# Patient Record
Sex: MALE | Race: WHITE | NOT HISPANIC OR LATINO | Employment: STUDENT | URBAN - METROPOLITAN AREA
[De-identification: names, ages, dates, MRNs, and addresses within clinical notes are randomized per-mention and may not be internally consistent; named-entity substitution may affect disease eponyms.]

---
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
## 2022-08-26 ENCOUNTER — OFFICE VISIT (OUTPATIENT)
Dept: FAMILY MEDICINE CLINIC | Facility: CLINIC | Age: 22
End: 2022-08-26
Payer: COMMERCIAL

## 2022-08-26 VITALS
SYSTOLIC BLOOD PRESSURE: 128 MMHG | HEART RATE: 82 BPM | DIASTOLIC BLOOD PRESSURE: 80 MMHG | WEIGHT: 175 LBS | TEMPERATURE: 98.8 F | OXYGEN SATURATION: 98 % | BODY MASS INDEX: 26.52 KG/M2 | RESPIRATION RATE: 12 BRPM | HEIGHT: 68 IN

## 2022-08-26 DIAGNOSIS — R20.2 NUMBNESS AND TINGLING IN RIGHT HAND: ICD-10-CM

## 2022-08-26 DIAGNOSIS — J30.9 ALLERGIC RHINITIS, UNSPECIFIED SEASONALITY, UNSPECIFIED TRIGGER: ICD-10-CM

## 2022-08-26 DIAGNOSIS — Z76.89 ENCOUNTER TO ESTABLISH CARE: Primary | ICD-10-CM

## 2022-08-26 DIAGNOSIS — R20.0 NUMBNESS AND TINGLING IN RIGHT HAND: ICD-10-CM

## 2022-08-26 PROCEDURE — 99204 OFFICE O/P NEW MOD 45 MIN: CPT

## 2022-08-26 NOTE — PATIENT INSTRUCTIONS
Carpal Tunnel Syndrome   AMBULATORY CARE:   Carpal tunnel syndrome (CTS)  is a condition that causes pressure to build in the carpal tunnel  The carpal tunnel is a small area between bones and tissues in your wrist  Swelling in this area puts pressure on the median nerve  The median nerve controls muscles and feeling in the hand  Common signs and symptoms:   Dull, sharp, or shooting pain in your hand    Numbness, tingling, or a burning feeling in your thumb, first finger, and middle finger    Arm pain that may extend to your shoulder    Weakness in your hand    Swelling in your hand    Not being able to control how your hand moves, or you drop objects    Seek care immediately if:   You suddenly lose feeling in your hand or fingers and you cannot move them  Your hand suddenly changes color  Contact your healthcare provider if:   Your symptoms get worse  Your hand and fingers are so weak that you cannot grab, squeeze, or lift items  You have questions or concerns about your condition or care  Treatment  may not be needed  If your symptoms continue or are severe, you may need any of the following:  NSAIDs  may be recommended to decrease swelling and pain  NSAIDs are available without a doctor's order  Ask your healthcare provider which medicine is right for you and how much to take  Take as directed  NSAIDs can cause stomach bleeding or kidney problems if not taken correctly  If you take blood thinning medicine, always ask your healthcare provider if NSAIDs are safe for you  Steroid injections  may help decrease pain and swelling  Steroid medicine is injected into the carpal tunnel  Transcutaneous electric nerve stimulation  uses mild electrical impulses to help decrease your wrist pain      Surgery  called decompression may be used to take pressure off of the median nerve in your wrist     Manage your symptoms:   Apply ice to your wrist   Ice helps decrease swelling and pain in your wrist  Ice may also help prevent tissue damage  Use an ice pack, or put crushed ice in a plastic bag  Cover the ice pack with a towel  Place it on your wrist for 15 to 20 minutes every hour, or as directed  Rest your hands  Let your hands rest for a short time between repetitive motions, such as typing  If you feel pain, stop what you are doing and gently massage your wrist and hand  Get physical and occupational therapy, if directed  Physical therapists will show you ways to exercise and strengthen your wrist  Occupational therapists will show you safe ways to use your wrist while you do your usual activities  Use a wrist splint as directed  A splint will keep your wrist straight or in a slightly bent position  A wrist splint decreases pressure on the median nerve by letting your wrist rest  You may need to wear the splint for up to 8 weeks  You may need to wear it at night  Follow up with your doctor as directed:  Write down your questions so you remember to ask them during your visits  © Copyright [employer] 2022 Information is for End User's use only and may not be sold, redistributed or otherwise used for commercial purposes  All illustrations and images included in CareNotes® are the copyrighted property of [employer]  or [employer]   The above information is an  only  It is not intended as medical advice for individual conditions or treatments  Talk to your doctor, nurse or pharmacist before following any medical regimen to see if it is safe and effective for you

## 2022-08-26 NOTE — PROGRESS NOTES
BMI Counseling: Body mass index is 26 61 kg/m²  The BMI is above normal  Nutrition recommendations include decreasing portion sizes, encouraging healthy choices of fruits and vegetables, decreasing fast food intake, consuming healthier snacks, limiting drinks that contain sugar, moderation in carbohydrate intake, increasing intake of lean protein, reducing intake of saturated and trans fat and reducing intake of cholesterol  Exercise recommendations include moderate physical activity 150 minutes/week and exercising 3-5 times per week  Rationale for BMI follow-up plan is due to patient being overweight or obese  Depression Screening and Follow-up Plan: Patient was screened for depression during today's encounter  They screened negative with a PHQ-2 score of 0  Assessment/Plan:         Problem List Items Addressed This Visit        Respiratory    Allergic rhinitis      controlled            Other    Numbness and tingling in right hand     Family history of carpal tunnel,  Scheduled with Orthopedics,  PT,  And make an appointment for ultrasound  Start using brace at night  Relevant Orders    US MSK limited    Ambulatory Referral to Orthopedic Surgery    Ambulatory Referral to PT/OT Hand Therapy      Other Visit Diagnoses     Encounter to establish care    -  Primary            Subjective:      Patient ID: Kamar Felix is a 23 y o  male  Patient presents to the office in order to establish care  He is complaining of bilateral, but mostly right hand numbness and tingling that is been going on for while  He does have family history of carpal tunnel  He works as a   He has tried Motrin as needed with little relief  He is denying any headaches, dizziness, shortness a breath or chest pain         The following portions of the patient's history were reviewed and updated as appropriate:   Past Medical History:  He has a past medical history of Allergic, Chronic serous otitis media of right ear (1/30/2015), and Perforation of tympanic membrane (2/26/2014)  ,  _______________________________________________________________________  Medical Problems:  does not have any pertinent problems on file ,  _______________________________________________________________________  Past Surgical History:   has a past surgical history that includes No past surgeries  ,  _______________________________________________________________________  Family History:  family history includes No Known Problems in his father, mother, paternal grandfather, paternal grandmother, sister, and sister  ,  _______________________________________________________________________  Social History:   reports that he has never smoked  He has never used smokeless tobacco  He reports that he does not drink alcohol and does not use drugs  ,  _______________________________________________________________________  Allergies:  has No Known Allergies     _______________________________________________________________________  No current outpatient medications on file  No current facility-administered medications for this visit      _______________________________________________________________________  Review of Systems   Constitutional: Negative for chills and fever  HENT: Negative for ear pain and sore throat  Eyes: Negative for pain and visual disturbance  Respiratory: Negative for cough and shortness of breath  Cardiovascular: Negative for chest pain and palpitations  Gastrointestinal: Negative for abdominal pain and vomiting  Genitourinary: Negative for dysuria and hematuria  Musculoskeletal: Negative for arthralgias and back pain  Skin: Negative for color change and rash  Neurological: Positive for numbness  Negative for seizures and syncope  All other systems reviewed and are negative          Objective:  Vitals:    08/26/22 1455   BP: 128/80   Pulse: 82   Resp: 12   Temp: 98 8 °F (37 1 °C)   SpO2: 98%   Weight: 79 4 kg (175 lb)   Height: 5' 8" (1 727 m)     Body mass index is 26 61 kg/m²  Physical Exam  Vitals and nursing note reviewed  Constitutional:       General: He is not in acute distress  Appearance: Normal appearance  He is not ill-appearing  Cardiovascular:      Rate and Rhythm: Normal rate and regular rhythm  Pulses: Normal pulses  Heart sounds: Normal heart sounds  Pulmonary:      Effort: Pulmonary effort is normal  No respiratory distress  Breath sounds: Normal breath sounds  No wheezing  Musculoskeletal:         General: Normal range of motion  Skin:     General: Skin is warm and dry  Neurological:      Mental Status: He is alert and oriented to person, place, and time  Sensory: No sensory deficit  Motor: No weakness  Gait: Gait normal    Psychiatric:         Mood and Affect: Mood normal          Behavior: Behavior normal          Thought Content:  Thought content normal          Judgment: Judgment normal

## 2022-08-26 NOTE — ASSESSMENT & PLAN NOTE
Family history of carpal tunnel,  Scheduled with Orthopedics,  PT,  And make an appointment for ultrasound  Start using brace at night

## 2022-09-17 ENCOUNTER — OFFICE VISIT (OUTPATIENT)
Dept: OBGYN CLINIC | Facility: CLINIC | Age: 22
End: 2022-09-17
Payer: COMMERCIAL

## 2022-09-17 VITALS
WEIGHT: 174 LBS | DIASTOLIC BLOOD PRESSURE: 71 MMHG | SYSTOLIC BLOOD PRESSURE: 107 MMHG | BODY MASS INDEX: 26.37 KG/M2 | HEIGHT: 68 IN | HEART RATE: 67 BPM

## 2022-09-17 DIAGNOSIS — M77.8 TENDINITIS OF BOTH WRISTS: ICD-10-CM

## 2022-09-17 DIAGNOSIS — G56.03 CARPAL TUNNEL SYNDROME, BILATERAL: Primary | ICD-10-CM

## 2022-09-17 PROCEDURE — 99243 OFF/OP CNSLTJ NEW/EST LOW 30: CPT | Performed by: FAMILY MEDICINE

## 2022-09-17 RX ORDER — NAPROXEN 500 MG/1
500 TABLET ORAL 2 TIMES DAILY WITH MEALS
Qty: 60 TABLET | Refills: 0 | Status: SHIPPED | OUTPATIENT
Start: 2022-09-17

## 2022-09-17 NOTE — LETTER
September 17, 2022     Jason Soria, [address] [address]    Patient: Jamaal Shepard   YOB: 2000   Date of Visit: 9/17/2022       Dear Dr Светлана Blackman: Thank you for referring Jamaal Shepard to me for evaluation  Below are my notes for this consultation  If you have questions, please do not hesitate to call me  I look forward to following your patient along with you  Sincerely,        Florecita Sapp DO        CC: No Recipients  Florecita Sapp DO  9/17/2022 11:05 AM  Sign when Signing Visit  Assessment/Plan:  Assessment/Plan   Diagnoses and all orders for this visit:    Carpal tunnel syndrome, bilateral  -     Ambulatory Referral to Orthopedic Surgery  -     naproxen (NAPROSYN) 500 mg tablet; Take 1 tablet (500 mg total) by mouth 2 (two) times a day with meals  -     Ambulatory Referral to PT/OT Hand Therapy; Future    Tendinitis of both wrists  -     naproxen (NAPROSYN) 500 mg tablet; Take 1 tablet (500 mg total) by mouth 2 (two) times a day with meals  -     Ambulatory Referral to PT/OT Hand Therapy; Future        28-year-old right-hand-dominant male  with bilateral wrist pain and numbness and tingling both hands more than 4 months duration  Discussed with patient physical exam, impression and plan  Physical exam unremarkable for bony or soft tissue tenderness the wrist and hands  He demonstrates intact range of motion and strength in the wrist and digits of both hands  He has normal sensation both upper extremities  There is positive median nerve Tinel's on the right  Phalen's is unremarkable bilaterally  He has normal radial pulse bilaterally  Clinical impression is that he is symptomatic from carpal tunnel syndrome and tendinitis both wrist   I discussed regimen of splinting, anti-inflammatory, and formal therapy  Surgery is not warranted without failing conservative management  He is to continue with splinting at night    He is to take naproxen 500 mg twice daily with food for 2 weeks  He is to start taking tumeric at least 1000 mg daily and tart cherry at least 1000 mg daily  He is to apply topical diclofenac gel 3 times a day to the volar aspect both wrist for the next 10 days and may also apply topical CBD  He is to start hand therapy as soon as possible and do home exercises as directed  He will follow up if no improvement after 4 weeks of formal therapy  Subjective:   Patient ID: Rolan Mcburney is a 23 y o  male  Chief Complaint   Patient presents with    Right Hand - Pain, Numbness    Left Hand - Pain       62-year-old right-hand-dominant male  presents for evaluation of bilateral wrist pain and numbness and tingling both hands more than 4 months duration  He denies particular trauma or inciting event  Pain described as gradual in onset, generalized to the wrist and radiating distally to the hand, associated numbness and tingling in the 1st 3 digits of both hands,, right worse than left, worse with repetitive gripping activities and improved resting  He sometimes takes ibuprofen to help with symptoms  He saw primary care provider and was suspected of having carpal tunnel  He was recommended on splint at nighttime, taking NSAIDs, referred for ultrasound of the wrist, and referred to orthopedic care  He has been wearing splint at night mostly in the right wrist     Hand Pain  This is a new problem  The current episode started more than 1 month ago  The problem occurs daily  The problem has been gradually worsening  Associated symptoms include arthralgias, numbness and weakness  Pertinent negatives include no abdominal pain, chest pain, chills, fever, joint swelling, rash or sore throat  Exacerbated by: Gripping  He has tried rest, immobilization and NSAIDs for the symptoms  The treatment provided mild relief             The following portions of the patient's history were reviewed and updated as appropriate: He has a past medical history of Allergic, Chronic serous otitis media of right ear (1/30/2015), and Perforation of tympanic membrane (2/26/2014)  He  has a past surgical history that includes No past surgeries  His family history includes No Known Problems in his father, mother, paternal grandfather, paternal grandmother, sister, and sister  He  reports that he has never smoked  He has never used smokeless tobacco  He reports that he does not drink alcohol and does not use drugs  He has No Known Allergies       Review of Systems   Constitutional: Negative for chills and fever  HENT: Negative for sore throat  Eyes: Negative for visual disturbance  Respiratory: Negative for shortness of breath  Cardiovascular: Negative for chest pain  Gastrointestinal: Negative for abdominal pain  Genitourinary: Negative for flank pain  Musculoskeletal: Positive for arthralgias  Negative for joint swelling  Skin: Negative for rash and wound  Neurological: Positive for weakness and numbness  Hematological: Does not bruise/bleed easily  Psychiatric/Behavioral: Negative for self-injury  Objective:  Vitals:    09/17/22 0854   BP: 107/71   Pulse: 67   Weight: 78 9 kg (174 lb)   Height: 5' 8" (1 727 m)     Right Hand Exam     Muscle Strength   The patient has normal right wrist strength  Tests   Phalens Sign: negative  Tinel's sign (median nerve): positive  Finkelstein's test: negative    Other   Sensation: normal  Pulse: present      Left Hand Exam     Muscle Strength   The patient has normal left wrist strength  Tests   Phalens Sign: negative  Tinel's sign (median nerve): negative  Finkelstein's test: negative    Other   Sensation: normal  Pulse: present      Right Elbow Exam     Tenderness   The patient is experiencing no tenderness  Muscle Strength   Right elbow normal strength: 5/5 flexion and extension      Tests   Tinel's sign (cubital tunnel): negative      Left Elbow Exam     Tenderness The patient is experiencing no tenderness  Muscle Strength   Left elbow normal strength: 5/5 flexion and extension  Tests   Tinel's sign (cubital tunnel): negative          Observations     Left Wrist/Hand   Negative for deformity  Right Wrist/Hand   Negative for deformity  Tenderness     Left Wrist/Hand   No tenderness in the first dorsal compartment, second dorsal compartment, fifth dorsal compartment, sixth dorsal compartment, carpometacarpal joint, triangular fibrocartilage complex , distal radioulnar joint, scaphoid and lunate  Right Wrist/Hand   No tenderness in the first dorsal compartment, second dorsal compartment, fifth dorsal compartment, sixth dorsal compartment, carpometacarpal joint, triangular fibrocartilage complex , distal radioulnar joint, scaphoid and lunate  Active Range of Motion     Left Wrist   Normal active range of motion    Right Wrist   Normal active range of motion    Additional Active Range of Motion Details  Normal range of motion of fingers of both hands    Strength/Myotome Testing     Left Wrist/Hand   Normal wrist strength    Right Wrist/Hand   Normal wrist strength    Tests     Left Wrist/Hand   Negative AIN OK sign, distal radial-ulnar joint stress, Finkelstein's, Phalen's sign, TFCC load, Tinel's sign (medial nerve) and Tinel's sign (radial tunnel)  Right Wrist/Hand   Positive Tinel's sign (medial nerve)  Negative AIN OK sign, distal radial-ulnar joint stress, Finkelstein's, Phalen's sign, TFCC load and Tinel's sign (radial tunnel)  Physical Exam  Vitals and nursing note reviewed  Constitutional:       General: He is not in acute distress  Appearance: He is well-developed  He is not ill-appearing or diaphoretic  HENT:      Head: Normocephalic and atraumatic  Right Ear: External ear normal       Left Ear: External ear normal    Eyes:      Conjunctiva/sclera: Conjunctivae normal    Neck:      Trachea: No tracheal deviation  Cardiovascular:      Rate and Rhythm: Normal rate  Pulmonary:      Effort: Pulmonary effort is normal  No respiratory distress  Abdominal:      General: There is no distension  Musculoskeletal:         General: No swelling, tenderness, deformity or signs of injury  Right hand: No deformity  Left hand: No deformity  Skin:     General: Skin is warm and dry  Coloration: Skin is not jaundiced or pale  Neurological:      Mental Status: He is alert and oriented to person, place, and time  Psychiatric:         Mood and Affect: Mood normal          Behavior: Behavior normal          Thought Content:  Thought content normal          Judgment: Judgment normal

## 2022-09-17 NOTE — PROGRESS NOTES
Assessment/Plan:  Assessment/Plan   Diagnoses and all orders for this visit:    Carpal tunnel syndrome, bilateral  -     Ambulatory Referral to Orthopedic Surgery  -     naproxen (NAPROSYN) 500 mg tablet; Take 1 tablet (500 mg total) by mouth 2 (two) times a day with meals  -     Ambulatory Referral to PT/OT Hand Therapy; Future    Tendinitis of both wrists  -     naproxen (NAPROSYN) 500 mg tablet; Take 1 tablet (500 mg total) by mouth 2 (two) times a day with meals  -     Ambulatory Referral to PT/OT Hand Therapy; Future        47-year-old right-hand-dominant male  with bilateral wrist pain and numbness and tingling both hands more than 4 months duration  Discussed with patient physical exam, impression and plan  Physical exam unremarkable for bony or soft tissue tenderness the wrist and hands  He demonstrates intact range of motion and strength in the wrist and digits of both hands  He has normal sensation both upper extremities  There is positive median nerve Tinel's on the right  Phalen's is unremarkable bilaterally  He has normal radial pulse bilaterally  Clinical impression is that he is symptomatic from carpal tunnel syndrome and tendinitis both wrist   I discussed regimen of splinting, anti-inflammatory, and formal therapy  Surgery is not warranted without failing conservative management  He is to continue with splinting at night  He is to take naproxen 500 mg twice daily with food for 2 weeks  He is to start taking tumeric at least 1000 mg daily and tart cherry at least 1000 mg daily  He is to apply topical diclofenac gel 3 times a day to the volar aspect both wrist for the next 10 days and may also apply topical CBD  He is to start hand therapy as soon as possible and do home exercises as directed  He will follow up if no improvement after 4 weeks of formal therapy  Subjective:   Patient ID: Marcel Rivera is a 23 y o  male    Chief Complaint   Patient presents with    Right Hand - Pain, Numbness    Left Hand - Pain       72-year-old right-hand-dominant male mady presents for evaluation of bilateral wrist pain and numbness and tingling both hands more than 4 months duration  He denies particular trauma or inciting event  Pain described as gradual in onset, generalized to the wrist and radiating distally to the hand, associated numbness and tingling in the 1st 3 digits of both hands,, right worse than left, worse with repetitive gripping activities and improved resting  He sometimes takes ibuprofen to help with symptoms  He saw primary care provider and was suspected of having carpal tunnel  He was recommended on splint at nighttime, taking NSAIDs, referred for ultrasound of the wrist, and referred to orthopedic care  He has been wearing splint at night mostly in the right wrist     Hand Pain  This is a new problem  The current episode started more than 1 month ago  The problem occurs daily  The problem has been gradually worsening  Associated symptoms include arthralgias, numbness and weakness  Pertinent negatives include no abdominal pain, chest pain, chills, fever, joint swelling, rash or sore throat  Exacerbated by: Gripping  He has tried rest, immobilization and NSAIDs for the symptoms  The treatment provided mild relief  The following portions of the patient's history were reviewed and updated as appropriate: He  has a past medical history of Allergic, Chronic serous otitis media of right ear (1/30/2015), and Perforation of tympanic membrane (2/26/2014)  He  has a past surgical history that includes No past surgeries  His family history includes No Known Problems in his father, mother, paternal grandfather, paternal grandmother, sister, and sister  He  reports that he has never smoked  He has never used smokeless tobacco  He reports that he does not drink alcohol and does not use drugs  He has No Known Allergies       Review of Systems   Constitutional: Negative for chills and fever  HENT: Negative for sore throat  Eyes: Negative for visual disturbance  Respiratory: Negative for shortness of breath  Cardiovascular: Negative for chest pain  Gastrointestinal: Negative for abdominal pain  Genitourinary: Negative for flank pain  Musculoskeletal: Positive for arthralgias  Negative for joint swelling  Skin: Negative for rash and wound  Neurological: Positive for weakness and numbness  Hematological: Does not bruise/bleed easily  Psychiatric/Behavioral: Negative for self-injury  Objective:  Vitals:    09/17/22 0854   BP: 107/71   Pulse: 67   Weight: 78 9 kg (174 lb)   Height: 5' 8" (1 727 m)     Right Hand Exam     Muscle Strength   The patient has normal right wrist strength  Tests   Phalens Sign: negative  Tinel's sign (median nerve): positive  Finkelstein's test: negative    Other   Sensation: normal  Pulse: present      Left Hand Exam     Muscle Strength   The patient has normal left wrist strength  Tests   Phalens Sign: negative  Tinel's sign (median nerve): negative  Finkelstein's test: negative    Other   Sensation: normal  Pulse: present      Right Elbow Exam     Tenderness   The patient is experiencing no tenderness  Muscle Strength   Right elbow normal strength: 5/5 flexion and extension  Tests   Tinel's sign (cubital tunnel): negative      Left Elbow Exam     Tenderness   The patient is experiencing no tenderness  Muscle Strength   Left elbow normal strength: 5/5 flexion and extension  Tests   Tinel's sign (cubital tunnel): negative          Observations     Left Wrist/Hand   Negative for deformity  Right Wrist/Hand   Negative for deformity  Tenderness     Left Wrist/Hand   No tenderness in the first dorsal compartment, second dorsal compartment, fifth dorsal compartment, sixth dorsal compartment, carpometacarpal joint, triangular fibrocartilage complex , distal radioulnar joint, scaphoid and lunate  Right Wrist/Hand   No tenderness in the first dorsal compartment, second dorsal compartment, fifth dorsal compartment, sixth dorsal compartment, carpometacarpal joint, triangular fibrocartilage complex , distal radioulnar joint, scaphoid and lunate  Active Range of Motion     Left Wrist   Normal active range of motion    Right Wrist   Normal active range of motion    Additional Active Range of Motion Details  Normal range of motion of fingers of both hands    Strength/Myotome Testing     Left Wrist/Hand   Normal wrist strength    Right Wrist/Hand   Normal wrist strength    Tests     Left Wrist/Hand   Negative AIN OK sign, distal radial-ulnar joint stress, Finkelstein's, Phalen's sign, TFCC load, Tinel's sign (medial nerve) and Tinel's sign (radial tunnel)  Right Wrist/Hand   Positive Tinel's sign (medial nerve)  Negative AIN OK sign, distal radial-ulnar joint stress, Finkelstein's, Phalen's sign, TFCC load and Tinel's sign (radial tunnel)  Physical Exam  Vitals and nursing note reviewed  Constitutional:       General: He is not in acute distress  Appearance: He is well-developed  He is not ill-appearing or diaphoretic  HENT:      Head: Normocephalic and atraumatic  Right Ear: External ear normal       Left Ear: External ear normal    Eyes:      Conjunctiva/sclera: Conjunctivae normal    Neck:      Trachea: No tracheal deviation  Cardiovascular:      Rate and Rhythm: Normal rate  Pulmonary:      Effort: Pulmonary effort is normal  No respiratory distress  Abdominal:      General: There is no distension  Musculoskeletal:         General: No swelling, tenderness, deformity or signs of injury  Right hand: No deformity  Left hand: No deformity  Skin:     General: Skin is warm and dry  Coloration: Skin is not jaundiced or pale  Neurological:      Mental Status: He is alert and oriented to person, place, and time     Psychiatric:         Mood and Affect: Mood normal          Behavior: Behavior normal          Thought Content:  Thought content normal          Judgment: Judgment normal

## 2023-05-29 NOTE — PROGRESS NOTES
Assessment/Plan:    Diagnoses and all orders for this visit:    Ingrown nail of great toe of left foot  -     cephalexin (KEFLEX) 500 mg capsule; Take 2 capsules (1,000 mg total) by mouth every 12 (twelve) hours for 10 days  -     mupirocin (BACTROBAN) 2 % ointment; Apply topically 3 (three) times a day      Will start Keflex 1000 mg twice a day  Keep toe clean and dry as possible  Wash daily with soapy water  Can also soak in Epsom salts  Apply mupirocin 3 times a day and cover with Band-Aid  Make sure to have socks on with shoes  Tylenol or Motrin p r n  pain or fever  Take Motrin with food to prevent stomach upset  Mom declined referral to Podiatry, she stated will follow up if not improving with Podiatry  Follow up if not improving or becomes worse with treatment  Follow up if develops fever or any new concerns  Subjective:     History provided by: patient and mother    Patient ID: Cathie Matias is a 14 y o  male      Here with mother due to left great toe is red and painful for the past week  Patient had cut his toenails very short  He started with redness and drainage of his left great toe a week ago  Mom has been putting Neosporin on it and patient has been soaking his foot in Epson salt  Toe has improved and is no longer draining  Came to the office because toe is still painful  Toe hurts to be in shoes  No fever  Has never had an ingrown toe nail before  The following portions of the patient's history were reviewed and updated as appropriate:   He  has a past medical history of Allergic; Chronic serous otitis media of right ear (1/30/2015); and Perforation of tympanic membrane (2/26/2014)  He   Patient Active Problem List    Diagnosis Date Noted    Allergic rhinitis 02/26/2014     He  has a past surgical history that includes No past surgeries  He  reports that he has never smoked   He has never used smokeless tobacco  He reports that he does not drink alcohol or use drugs   Current Outpatient Prescriptions   Medication Sig Dispense Refill    cephalexin (KEFLEX) 500 mg capsule Take 2 capsules (1,000 mg total) by mouth every 12 (twelve) hours for 10 days 40 capsule 0    mupirocin (BACTROBAN) 2 % ointment Apply topically 3 (three) times a day 30 g 0     No current facility-administered medications for this visit  He has No Known Allergies       Review of Systems   Constitutional: Negative for activity change and fever  Musculoskeletal: Negative for gait problem  Skin: Positive for wound (  Left great toe)  Objective:    Vitals:    10/25/18 1705   Pulse: 100   Temp: 98 3 °F (36 8 °C)   Weight: 81 6 kg (180 lb)   Height: 5' 7 25" (1 708 m)       Physical Exam   Constitutional: He is oriented to person, place, and time  Vital signs are normal  He appears well-developed and well-nourished  He is active and cooperative  HENT:   Head: Normocephalic and atraumatic  Right Ear: Hearing, tympanic membrane, external ear and ear canal normal  No drainage  Left Ear: Hearing, tympanic membrane, external ear and ear canal normal  No drainage  Nose: Nose normal    Mouth/Throat: Uvula is midline, oropharynx is clear and moist and mucous membranes are normal    Eyes: Conjunctivae and lids are normal  Right eye exhibits no discharge  Left eye exhibits no discharge  Neck: Normal range of motion  Neck supple  Cardiovascular: Normal rate, regular rhythm, S1 normal, S2 normal and normal heart sounds  No murmur heard  Pulmonary/Chest: Effort normal and breath sounds normal  He has no wheezes  Musculoskeletal: Normal range of motion  Left foot: There is tenderness (  Edge of left great toe) and swelling ( edge of left great toe)  Feet:    Neurological: He is alert and oriented to person, place, and time  He has normal strength  Coordination and gait normal    Skin: Skin is warm and dry  Psychiatric: He has a normal mood and affect   His speech is normal and behavior is normal        Patient Instructions     Ingrown Nail   WHAT YOU NEED TO KNOW:   An ingrown nail is when the edge of your fingernail or toenail grows into the skin next to it  The most common cause is when nails are trimmed too short  DISCHARGE INSTRUCTIONS:   Return to the emergency department if:   · You have a red streak running up your leg or arm  Contact your healthcare provider if:   · Your pain is getting worse  · Your nail and skin are more swollen or start to drain pus  · You have a fever or chills  · Your ingrown nail is not better in 7 days  · You have questions or concerns about your condition or care  Medicines:   · Acetaminophen  decreases pain and can be bought without a doctor's order  Ask how much to take and how often to take it  Follow directions  Acetaminophen can cause liver damage if not taken correctly  · NSAIDs , such as ibuprofen, help decrease swelling, pain, and fever  This medicine is available with or without a doctor's order  NSAIDs can cause stomach bleeding or kidney problems in certain people  If you take blood thinner medicine, always ask your healthcare provider if NSAIDs are safe for you  Always read the medicine label and follow directions  · Antibiotics  help treat or prevent a bacterial infection  They may be given as an ointment, pill, or both  · Take your medicine as directed  Contact your healthcare provider if you think your medicine is not helping or if you have side effects  Tell him or her if you are allergic to any medicine  Keep a list of the medicines, vitamins, and herbs you take  Include the amounts, and when and why you take them  Bring the list or the pill bottles to follow-up visits  Carry your medicine list with you in case of an emergency  Self-care:   · Soak and lift the nail  Soak your ingrown nail in warm water for 20 minutes, 2 to 3 times each day  Then gently lift the edge of the ingrown nail away from the skin  Wedge a small piece of cotton or gauze under the corner of the nail  You can also put dental floss under the nail to lift the edge away from the skin  This may help keep the nail from growing into the skin  · Keep your nails clean and dry  Wash your hands and feet with soap and water  Pat dry with a clean towel  Dry in between each toe  Do not put lotion between your toes  Prevent another ingrown nail:   · Carefully trim your nails  Cut your nails straight across  Do not cut them too short  Lightly file the nail corners if you have sharp edges  Do not round your nails  Do not rip or tear off the tips of your nails  This may cause your nail edge to grow into the skin  Use clippers, not nail scissors  · Wear shoes and socks that fit well  Make sure they are not too tight  You may need to wear a shoe with the toe cut out, such as sandals, until your ingrown toenail heals  Do not wear shoes that have pointed toes or heels that are more than 2 inches high  Do not wear tight hose or socks  Wear socks that pull moisture away from your feet, such as cotton-acrylic blends  · Inspect your nails daily  Look for signs of an ingrown nail  Manage problems early so the nail does not become infected  Follow up with your healthcare provider as directed: You may be referred to a podiatrist  Write down your questions so you remember to ask them during your visits  © 2017 [employer] Information is for End User's use only and may not be sold, redistributed or otherwise used for commercial purposes  All illustrations and images included in CareNotes® are the copyrighted property of [employer]  or [employer]  The above information is an  only  It is not intended as medical advice for individual conditions or treatments  Talk to your doctor, nurse or pharmacist before following any medical regimen to see if it is safe and effective for you

## 2023-11-22 ENCOUNTER — OFFICE VISIT (OUTPATIENT)
Dept: URGENT CARE | Facility: CLINIC | Age: 23
End: 2023-11-22
Payer: COMMERCIAL

## 2023-11-22 VITALS
SYSTOLIC BLOOD PRESSURE: 131 MMHG | DIASTOLIC BLOOD PRESSURE: 59 MMHG | RESPIRATION RATE: 16 BRPM | HEART RATE: 60 BPM | OXYGEN SATURATION: 98 % | TEMPERATURE: 97.4 F

## 2023-11-22 DIAGNOSIS — H66.001 NON-RECURRENT ACUTE SUPPURATIVE OTITIS MEDIA OF RIGHT EAR WITHOUT SPONTANEOUS RUPTURE OF TYMPANIC MEMBRANE: Primary | ICD-10-CM

## 2023-11-22 PROCEDURE — 99213 OFFICE O/P EST LOW 20 MIN: CPT | Performed by: PHYSICIAN ASSISTANT

## 2023-11-22 RX ORDER — AMOXICILLIN 500 MG/1
1000 CAPSULE ORAL EVERY 8 HOURS SCHEDULED
Qty: 42 CAPSULE | Refills: 0 | Status: SHIPPED | OUTPATIENT
Start: 2023-11-22 | End: 2023-11-29

## 2023-11-22 RX ORDER — IBUPROFEN 200 MG
TABLET ORAL EVERY 6 HOURS PRN
COMMUNITY

## 2023-11-22 NOTE — PROGRESS NOTES
[facility]        NAME: Samara Henry is a 26 y.o. male  : 2000    MRN: [id number]  DATE: 2023  TIME: 5:25 PM      Assessment and Plan     Non-recurrent acute suppurative otitis media of right ear without spontaneous rupture of tympanic membrane [H66.001]  1. Non-recurrent acute suppurative otitis media of right ear without spontaneous rupture of tympanic membrane  amoxicillin (AMOXIL) 500 mg capsule            Patient Instructions   There are no Patient Instructions on file for this visit. Follow up with PCP in 3-5 days. Go to ER if symptoms worsen. Chief Complaint     Chief Complaint   Patient presents with    Earache     Cold symptoms for last few days. Woke up today with right ear pain and feels like their is fluid in it. Reports bumps to back of right ear that are painful. History of Present Illness     Patient presents with cough, nasal congestion, and runny nose x 2 days and right ear pain x today. He took motrin with mild relief. Earache   Associated symptoms include coughing, rhinorrhea and a sore throat. Pertinent negatives include no abdominal pain, diarrhea, headaches, rash or vomiting. Review of Systems     Review of Systems   Constitutional:  Negative for chills, fatigue and fever. HENT:  Positive for congestion, ear pain, rhinorrhea and sore throat. Negative for postnasal drip, sinus pressure, sinus pain and sneezing. Eyes:  Negative for pain and visual disturbance. Respiratory:  Positive for cough. Negative for shortness of breath. Cardiovascular:  Negative for chest pain and palpitations. Gastrointestinal:  Negative for abdominal pain, diarrhea, nausea and vomiting. Genitourinary:  Negative for dysuria and hematuria. Musculoskeletal:  Negative for arthralgias, back pain and myalgias. Skin:  Negative for rash. Neurological:  Negative for dizziness, seizures, syncope, numbness and headaches.    All other systems reviewed and are negative. Current Medications       Current Outpatient Medications:     amoxicillin (AMOXIL) 500 mg capsule, Take 2 capsules (1,000 mg total) by mouth every 8 (eight) hours for 7 days, Disp: 42 capsule, Rfl: 0    ibuprofen (MOTRIN) 200 mg tablet, Take by mouth every 6 (six) hours as needed for mild pain, Disp: , Rfl:     naproxen (NAPROSYN) 500 mg tablet, Take 1 tablet (500 mg total) by mouth 2 (two) times a day with meals (Patient not taking: Reported on 11/22/2023), Disp: 60 tablet, Rfl: 0    Current Allergies     Allergies as of 11/22/2023    (No Known Allergies)              The following portions of the patient's history were reviewed and updated as appropriate: allergies, current medications, past family history, past medical history, past social history, past surgical history, and problem list.     Past Medical History:   Diagnosis Date    Allergic     Chronic serous otitis media of right ear 1/30/2015    Perforation of tympanic membrane 2/26/2014       Past Surgical History:   Procedure Laterality Date    NO PAST SURGERIES         Family History   Problem Relation Age of Onset    No Known Problems Mother     No Known Problems Father     No Known Problems Sister     No Known Problems Paternal Grandmother     No Known Problems Paternal Grandfather     No Known Problems Sister     Addiction problem Neg Hx     Mental illness Neg Hx          Medications have been verified. Objective     /59   Pulse 60   Temp (!) 97.4 °F (36.3 °C)   Resp 16   SpO2 98%   No LMP for male patient. Physical Exam     Physical Exam  Vitals and nursing note reviewed. Constitutional:       Appearance: Normal appearance. He is normal weight. HENT:      Head: Normocephalic and atraumatic.       Right Ear: Ear canal and external ear normal.      Left Ear: Tympanic membrane, ear canal and external ear normal.      Ears:      Comments: Right TM severely retracted, purulent air-fluid level, and erythematous. Nose: Nose normal.      Mouth/Throat:      Mouth: Mucous membranes are moist.      Pharynx: Oropharynx is clear. Cardiovascular:      Rate and Rhythm: Normal rate and regular rhythm. Heart sounds: Normal heart sounds. Pulmonary:      Effort: Pulmonary effort is normal.      Breath sounds: Normal breath sounds. Skin:     General: Skin is warm and dry. Neurological:      General: No focal deficit present. Mental Status: He is alert and oriented to person, place, and time.    Psychiatric:         Mood and Affect: Mood normal.         Behavior: Behavior normal.

## 2023-12-18 ENCOUNTER — OFFICE VISIT (OUTPATIENT)
Dept: FAMILY MEDICINE CLINIC | Facility: CLINIC | Age: 23
End: 2023-12-18
Payer: COMMERCIAL

## 2023-12-18 VITALS
OXYGEN SATURATION: 97 % | DIASTOLIC BLOOD PRESSURE: 70 MMHG | BODY MASS INDEX: 25.61 KG/M2 | HEART RATE: 87 BPM | HEIGHT: 68 IN | WEIGHT: 169 LBS | SYSTOLIC BLOOD PRESSURE: 110 MMHG | TEMPERATURE: 97.4 F

## 2023-12-18 DIAGNOSIS — H65.91 OME (OTITIS MEDIA WITH EFFUSION), RIGHT: ICD-10-CM

## 2023-12-18 DIAGNOSIS — J02.9 SORE THROAT: Primary | ICD-10-CM

## 2023-12-18 LAB — S PYO AG THROAT QL: POSITIVE

## 2023-12-18 PROCEDURE — 99213 OFFICE O/P EST LOW 20 MIN: CPT | Performed by: FAMILY MEDICINE

## 2023-12-18 PROCEDURE — 87880 STREP A ASSAY W/OPTIC: CPT | Performed by: FAMILY MEDICINE

## 2023-12-18 RX ORDER — AZITHROMYCIN 250 MG/1
TABLET, FILM COATED ORAL
Qty: 6 TABLET | Refills: 0 | Status: SHIPPED | OUTPATIENT
Start: 2023-12-18 | End: 2023-12-22

## 2023-12-18 NOTE — PROGRESS NOTES
BMI Counseling: Body mass index is 25.7 kg/m². The BMI is above normal. Nutrition recommendations include decreasing portion sizes, encouraging healthy choices of fruits and vegetables, decreasing fast food intake, consuming healthier snacks, limiting drinks that contain sugar, moderation in carbohydrate intake, increasing intake of lean protein and reducing intake of cholesterol. Exercise recommendations include moderate physical activity 150 minutes/week and exercising 3-5 times per week. No pharmacotherapy was ordered. Rationale for BMI follow-up plan is due to patient being overweight or obese.     Depression Screening and Follow-up Plan: Patient was screened for depression during today's encounter. They screened negative with a PHQ-2 score of 0.      Assessment/Plan:     Chronic Problems:  No problem-specific Assessment & Plan notes found for this encounter.      Visit Diagnosis:  Diagnoses and all orders for this visit:    Sore throat  Comments:  Discussed finding positive strep antibiotic as directed, oral hygiene hygiene, fluids Tylenol Motrin as needed follow-up  Orders:  -     POCT rapid ANTIGEN strepA  -     azithromycin (ZITHROMAX) 250 mg tablet; Take 2 tablets today then 1 tablet daily x 4 days    OME (otitis media with effusion), right  Comments:  Discussed findings previous treatments negative history recurrent, advised to follow-up approximately 2 weeks          Subjective:    Patient ID: Mckinley Rojas is a 23 y.o. male.    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 Pertinent negatives include no abdominal pain, coughing, diarrhea, headaches, hearing loss, rash, rhinorrhea or vomiting.   Generalized Body Aches  Associated symptoms include ear pain and a sore throat. Pertinent negatives include no congestion, headaches, hearing loss, rhinorrhea, fever, chest pain, coughing, shortness of breath, abdominal pain, constipation, diarrhea, nausea, vomiting, joint swelling or rash.   Sore Throat   Associated symptoms include ear pain. Pertinent negatives include no abdominal pain, congestion, coughing, diarrhea, headaches, shortness of breath or vomiting.   Fever  Associated symptoms include a sore throat. Pertinent negatives include no abdominal pain, arthralgias, chest pain, chills, congestion, coughing, fever, headaches, joint swelling, myalgias, nausea, numbness, rash, vomiting or weakness.       The following portions of the patient's history were reviewed and updated as appropriate: allergies, current medications, past family history, past medical history, past social history, past surgical history and problem list.    Review of Systems   Constitutional:  Negative for appetite change, chills, fever and unexpected weight change.   HENT:  Positive for ear pain and sore throat. Negative for congestion, dental problem, hearing loss, postnasal drip, rhinorrhea, sinus pressure, sinus pain, sneezing, tinnitus and voice change.    Eyes:  Negative for visual disturbance.   Respiratory:  Negative for apnea, cough, chest tightness and shortness of breath.    Cardiovascular:  Negative for chest pain, palpitations and leg swelling.   Gastrointestinal:  Negative for abdominal pain, blood in stool, constipation, diarrhea, nausea and vomiting.   Endocrine: Negative for cold intolerance, heat intolerance, polydipsia, polyphagia and polyuria.   Genitourinary:  Negative for decreased urine volume, difficulty urinating, dysuria, frequency and hematuria.   Musculoskeletal:  Negative for  "arthralgias, back pain, gait problem, joint swelling and myalgias.   Skin:  Negative for color change, rash and wound.   Allergic/Immunologic: Negative for environmental allergies and food allergies.   Neurological:  Negative for dizziness, syncope, weakness, light-headedness, numbness and headaches.   Hematological:  Negative for adenopathy. Does not bruise/bleed easily.   Psychiatric/Behavioral:  Negative for sleep disturbance and suicidal ideas. The patient is not nervous/anxious.          /70   Pulse 87   Temp (!) 97.4 °F (36.3 °C)   Ht 5' 8\" (1.727 m)   Wt 76.7 kg (169 lb)   SpO2 97%   BMI 25.70 kg/m²   Social History     Socioeconomic History    Marital status: Single     Spouse name: Not on file    Number of children: Not on file    Years of education: Not on file    Highest education level: Not on file   Occupational History    Not on file   Tobacco Use    Smoking status: Never    Smokeless tobacco: Never   Vaping Use    Vaping status: Never Used   Substance and Sexual Activity    Alcohol use: No    Drug use: No    Sexual activity: Not Currently     Partners: Female     Birth control/protection: Condom Male   Other Topics Concern    Not on file   Social History Narrative    Lives with parents; siblings 2 sisters live outside home        Pets 3 dogs    Has carbon monoxide and smoke detectors    No guns in the home.    Uses safety belt in the car.    Dad smokes outside.         Social Determinants of Health     Financial Resource Strain: Not on file   Food Insecurity: Not on file   Transportation Needs: Not on file   Physical Activity: Not on file   Stress: Not on file   Social Connections: Not on file   Intimate Partner Violence: Not on file   Housing Stability: Not on file     Past Medical History:   Diagnosis Date    Allergic     Chronic serous otitis media of right ear 1/30/2015    Perforation of tympanic membrane 2/26/2014     Family History   Problem Relation Age of Onset    No Known Problems " "Mother     No Known Problems Father     No Known Problems Sister     No Known Problems Paternal Grandmother     No Known Problems Paternal Grandfather     No Known Problems Sister     Addiction problem Neg Hx     Mental illness Neg Hx      Past Surgical History:   Procedure Laterality Date    NO PAST SURGERIES         Current Outpatient Medications:     azithromycin (ZITHROMAX) 250 mg tablet, Take 2 tablets today then 1 tablet daily x 4 days, Disp: 6 tablet, Rfl: 0    ibuprofen (MOTRIN) 200 mg tablet, Take by mouth every 6 (six) hours as needed for mild pain, Disp: , Rfl:     No Known Allergies       Lab Review   No visits with results within 2 Month(s) from this visit.   Latest known visit with results is:   Office Visit on 12/01/2021   Component Date Value    SARS-CoV-2 12/01/2021 Negative         Imaging: No results found.    Objective:     Physical Exam  Constitutional:       General: He is not in acute distress.     Appearance: He is not ill-appearing or toxic-appearing.   HENT:      Head: Normocephalic and atraumatic.      Right Ear: A middle ear effusion is present. Tympanic membrane is erythematous.      Left Ear: Tympanic membrane normal.      Mouth/Throat:      Pharynx: Posterior oropharyngeal erythema present.      Tonsils: No tonsillar exudate or tonsillar abscesses.   Eyes:      Conjunctiva/sclera: Conjunctivae normal.   Cardiovascular:      Rate and Rhythm: Normal rate.   Lymphadenopathy:      Cervical: Cervical adenopathy present.           There are no Patient Instructions on file for this visit.    TARIK Dennis    Portions of the record may have been created with voice recognition software.  Occasional wrong word or \"sound a like\" substitutions may have occurred due to the inherent limitations of voice recognition software.  Read the chart carefully and recognize, using context, where substitutions have occurred.  "

## 2024-02-23 ENCOUNTER — TELEPHONE (OUTPATIENT)
Age: 24
End: 2024-02-23

## 2024-02-23 ENCOUNTER — OFFICE VISIT (OUTPATIENT)
Dept: FAMILY MEDICINE CLINIC | Facility: CLINIC | Age: 24
End: 2024-02-23
Payer: COMMERCIAL

## 2024-02-23 VITALS
RESPIRATION RATE: 12 BRPM | BODY MASS INDEX: 26.67 KG/M2 | SYSTOLIC BLOOD PRESSURE: 100 MMHG | TEMPERATURE: 97.6 F | HEIGHT: 68 IN | WEIGHT: 176 LBS | DIASTOLIC BLOOD PRESSURE: 70 MMHG | OXYGEN SATURATION: 98 %

## 2024-02-23 DIAGNOSIS — Z80.0 FAMILY HISTORY OF PANCREATIC CANCER: ICD-10-CM

## 2024-02-23 DIAGNOSIS — E55.9 VITAMIN D DEFICIENCY: ICD-10-CM

## 2024-02-23 DIAGNOSIS — Z13.6 SCREENING FOR CARDIOVASCULAR CONDITION: ICD-10-CM

## 2024-02-23 DIAGNOSIS — Z00.00 ANNUAL PHYSICAL EXAM: ICD-10-CM

## 2024-02-23 DIAGNOSIS — G56.03 BILATERAL CARPAL TUNNEL SYNDROME: Primary | ICD-10-CM

## 2024-02-23 PROCEDURE — 99395 PREV VISIT EST AGE 18-39: CPT

## 2024-02-23 PROCEDURE — 99213 OFFICE O/P EST LOW 20 MIN: CPT

## 2024-02-23 NOTE — ASSESSMENT & PLAN NOTE
Has used bilateral braces in the past, was also treated with anti-inflammatories.  Still with chronic wrist pain.  Will do MSK studies and refer to orthopedics for additional treatment and management.  Positive Phalen's test today.

## 2024-02-23 NOTE — PROGRESS NOTES
ADULT ANNUAL PHYSICAL  [facility] [address] [facility]    NAME: Mckinley Rojas  AGE: 23 y.o. SEX: male  : 2000     DATE: 2024     Assessment and Plan:     Problem List Items Addressed This Visit        Nervous and Auditory    Bilateral carpal tunnel syndrome - Primary     Has used bilateral braces in the past, was also treated with anti-inflammatories.  Still with chronic wrist pain.  Will do MSK studies and refer to orthopedics for additional treatment and management.  Positive Phalen's test today.         Relevant Orders    US MSK limited    US MSK limited    Ambulatory Referral to Orthopedic Surgery       Other    Family history of pancreatic cancer     Will do lab work and continue to monitor for any symptoms.         Relevant Orders    Lipase    Amylase   Other Visit Diagnoses     Annual physical exam        Great job with activity, recommend 5 or more fruits or vegetables daily, lots of water lean protein.  Have fasting lab work we will call with results    Screening for cardiovascular condition        Have fasting lab work, will call with results    Relevant Orders    Lipid panel    Hemoglobin A1C    CBC and Platelet    Comprehensive metabolic panel    TSH, 3rd generation with Free T4 reflex    Vitamin D deficiency        Have fasting lab work, will call with results    Relevant Orders    Vitamin D 25 hydroxy          Immunizations and preventive care screenings were discussed with patient today. Appropriate education was printed on patient's after visit summary.    Counseling:  Alcohol/drug use: discussed moderation in alcohol intake, the recommendations for healthy alcohol use, and avoidance of illicit drug use.  Dental Health: discussed importance of regular tooth brushing, flossing, and dental visits.  Injury prevention: discussed safety/seat belts, safety helmets, smoke detectors, carbon dioxide detectors, and smoking near  bedding or upholstery.  Sexual health: discussed sexually transmitted diseases, partner selection, use of condoms, avoidance of unintended pregnancy, and contraceptive alternatives.  Exercise: the importance of regular exercise/physical activity was discussed. Recommend exercise 3-5 times per week for at least 30 minutes.          Return in 1 year (on 2/23/2025).     Chief Complaint:     Chief Complaint   Patient presents with   • Physical Exam      History of Present Illness:     Adult Annual Physical   Patient here for a comprehensive physical exam. The patient reports problems - carpel tunnel .    Diet and Physical Activity  Diet/Nutrition: well balanced  Exercise: moderate cardiovascular exercise, strength training exercises, and 5-7 times a week on average.      Depression Screening  PHQ-2/9 Depression Screening         General Health  Sleep: gets 7-8 hours of sleep on average.   Hearing: normal - bilateral.  Vision: most recent eye exam >1 year ago.   Dental: regular dental visits, brushes teeth twice daily, and flosses teeth occasionally.        Health  History of STDs?: no.    Advanced Care Planning  Do you have an advanced directive? no  Do you have a durable medical power of ? no  ACP document given to the patient? no     Review of Systems:     Review of Systems   Constitutional:  Negative for chills, fatigue and fever.   HENT:  Negative for congestion, ear pain, rhinorrhea, sinus pressure, sinus pain and sore throat.    Eyes:  Negative for pain and visual disturbance.   Respiratory:  Negative for cough, chest tightness, shortness of breath and wheezing.    Cardiovascular:  Negative for chest pain and palpitations.   Gastrointestinal:  Negative for abdominal pain, constipation, diarrhea, nausea and vomiting.   Genitourinary:  Negative for dysuria, frequency, hematuria and urgency.   Musculoskeletal:  Positive for arthralgias.   Skin:  Negative for color change and rash.   Neurological:  Negative  for dizziness, seizures, syncope, light-headedness and headaches.   Psychiatric/Behavioral:  Negative for dysphoric mood and sleep disturbance. The patient is not nervous/anxious.    All other systems reviewed and are negative.     Past Medical History:     Past Medical History:   Diagnosis Date   • Allergic    • Chronic serous otitis media of right ear 1/30/2015   • Perforation of tympanic membrane 2/26/2014      Past Surgical History:     Past Surgical History:   Procedure Laterality Date   • NO PAST SURGERIES        Social History:     Social History     Socioeconomic History   • Marital status: Single     Spouse name: None   • Number of children: None   • Years of education: None   • Highest education level: None   Occupational History   • None   Tobacco Use   • Smoking status: Never   • Smokeless tobacco: Never   Vaping Use   • Vaping status: Never Used   Substance and Sexual Activity   • Alcohol use: No   • Drug use: No   • Sexual activity: Not Currently     Partners: Female     Birth control/protection: Condom Male   Other Topics Concern   • None   Social History Narrative    Lives with parents; siblings 2 sisters live outside home        Pets 3 dogs    Has carbon monoxide and smoke detectors    No guns in the home.    Uses safety belt in the car.    Dad smokes outside.         Social Determinants of Health     Financial Resource Strain: Not on file   Food Insecurity: Not on file   Transportation Needs: Not on file   Physical Activity: Not on file   Stress: Not on file   Social Connections: Not on file   Intimate Partner Violence: Not on file   Housing Stability: Not on file      Family History:     Family History   Problem Relation Age of Onset   • No Known Problems Mother    • No Known Problems Father    • No Known Problems Sister    • No Known Problems Paternal Grandmother    • No Known Problems Paternal Grandfather    • No Known Problems Sister    • Addiction problem Neg Hx    • Mental illness Neg Hx   "     Current Medications:     Current Outpatient Medications   Medication Sig Dispense Refill   • ibuprofen (MOTRIN) 200 mg tablet Take by mouth every 6 (six) hours as needed for mild pain (Patient not taking: Reported on 2/23/2024)       No current facility-administered medications for this visit.      Allergies:     No Known Allergies   Physical Exam:     /70   Temp 97.6 °F (36.4 °C)   Resp 12   Ht 5' 8\" (1.727 m)   Wt 79.8 kg (176 lb)   SpO2 98%   BMI 26.76 kg/m²     Physical Exam  Vitals and nursing note reviewed.   Constitutional:       General: He is not in acute distress.     Appearance: He is well-developed. He is not ill-appearing.   HENT:      Head: Normocephalic and atraumatic.      Right Ear: Tympanic membrane, ear canal and external ear normal. There is no impacted cerumen.      Left Ear: Tympanic membrane, ear canal and external ear normal. There is no impacted cerumen.      Nose: Nose normal. No congestion.      Mouth/Throat:      Mouth: Mucous membranes are moist.      Pharynx: No posterior oropharyngeal erythema.   Eyes:      Extraocular Movements: Extraocular movements intact.      Conjunctiva/sclera: Conjunctivae normal.      Pupils: Pupils are equal, round, and reactive to light.   Cardiovascular:      Rate and Rhythm: Normal rate and regular rhythm.      Heart sounds: No murmur heard.  Pulmonary:      Effort: Pulmonary effort is normal. No respiratory distress.      Breath sounds: Normal breath sounds.   Abdominal:      Palpations: Abdomen is soft.      Tenderness: There is no abdominal tenderness.   Musculoskeletal:         General: No swelling.      Cervical back: Normal range of motion and neck supple.      Right lower leg: No edema.      Left lower leg: No edema.   Lymphadenopathy:      Cervical: No cervical adenopathy.   Skin:     General: Skin is warm and dry.      Capillary Refill: Capillary refill takes less than 2 seconds.   Neurological:      General: No focal deficit " present.      Mental Status: He is alert.   Psychiatric:         Mood and Affect: Mood normal.         Behavior: Behavior normal.          TARIK Hood   [facility] [address] [facility]

## 2024-02-23 NOTE — PATIENT INSTRUCTIONS
Problem List Items Addressed This Visit          Nervous and Auditory    Bilateral carpal tunnel syndrome - Primary     Has used bilateral braces in the past, was also treated with anti-inflammatories.  Still with chronic wrist pain.  Will do MSK studies and refer to orthopedics for additional treatment and management.  Positive Phalen's test today.         Relevant Orders    US MSK limited    US MSK limited    Ambulatory Referral to Orthopedic Surgery       Other    Family history of pancreatic cancer     Will do lab work and continue to monitor for any symptoms.         Relevant Orders    Lipase    Amylase     Other Visit Diagnoses       Annual physical exam        Great job with activity, recommend 5 or more fruits or vegetables daily, lots of water lean protein.  Have fasting lab work we will call with results    Screening for cardiovascular condition        Have fasting lab work, will call with results    Relevant Orders    Lipid panel    Hemoglobin A1C    CBC and Platelet    Comprehensive metabolic panel    TSH, 3rd generation with Free T4 reflex    Vitamin D deficiency        Have fasting lab work, will call with results    Relevant Orders    Vitamin D 25 hydroxy          Wellness Visit for Adults   AMBULATORY CARE:   A wellness visit  is when you see your healthcare provider to get screened for health problems. Your healthcare provider will also give you advice on how to stay healthy. Write down your questions so you remember to ask them. Ask your healthcare provider how often you should have a wellness visit.  What happens at a wellness visit:  Your healthcare provider will ask about your health, and your family history of health problems. This includes high blood pressure, heart disease, and cancer. He or she will ask if you have symptoms that concern you, if you smoke, and about your mood. You may also be asked about your intake of medicines, supplements, food, and alcohol. Any of the following may be  done:  Your weight  will be checked. Your height may also be checked so your body mass index (BMI) can be calculated. Your BMI shows if you are at a healthy weight.    Your blood pressure  and heart rate will be checked. Your temperature may also be checked.    Blood and urine tests  may be done. Blood tests may be done to check your cholesterol levels. Abnormal cholesterol levels increase your risk for heart disease and stroke. You may also need a blood or urine test to check for diabetes if you are at increased risk. Urine tests may be done to look for signs of an infection or kidney disease.    A physical exam  includes checking your heartbeat and lungs with a stethoscope. Your healthcare provider may also check your skin to look for sun damage.    Screening tests  may be recommended. A screening test is done to check for diseases that may not cause symptoms. The screening tests you may need depend on your age, gender, family history, and lifestyle habits. For example, colorectal screening may be recommended if you are 50 years old or older.    Screening tests you need if you are a woman:   A Pap smear  is used to screen for cervical cancer. Pap smears are usually done every 3 to 5 years depending on your age. You may need them more often if you have had abnormal Pap smear test results in the past. Ask your healthcare provider how often you should have a Pap smear.    A mammogram  is an x-ray of your breasts to screen for breast cancer. Experts recommend mammograms every 2 years starting at age 50 years. You may need a mammogram at age 49 years or younger if you have an increased risk for breast cancer. Talk to your healthcare provider about when you should start having mammograms and how often you need them.    Vaccines you may need:   Get an influenza vaccine  every year. The influenza vaccine protects you from the flu. Several types of viruses cause the flu. The viruses change over time, so new vaccines are  made each year.    Get a tetanus-diphtheria (Td) booster vaccine  every 10 years. This vaccine protects you against tetanus and diphtheria. Tetanus is a severe infection that may cause painful muscle spasms and lockjaw. Diphtheria is a severe bacterial infection that causes a thick covering in the back of your mouth and throat.    Get a human papillomavirus (HPV) vaccine  if you are female and aged 19 to 26 or male 19 to 21 and never received it. This vaccine protects you from HPV infection. HPV is the most common infection spread by sexual contact. HPV may also cause vaginal, penile, and anal cancers.    Get a pneumococcal vaccine  if you are aged 65 years or older. The pneumococcal vaccine is an injection given to protect you from pneumococcal disease. Pneumococcal disease is an infection caused by pneumococcal bacteria. The infection may cause pneumonia, meningitis, or an ear infection.    Get a shingles vaccine  if you are 60 or older, even if you have had shingles before. The shingles vaccine is an injection to protect you from the varicella-zoster virus. This is the same virus that causes chickenpox. Shingles is a painful rash that develops in people who had chickenpox or have been exposed to the virus.    How to eat healthy:  My Plate is a model for planning healthy meals. It shows the types and amounts of foods that should go on your plate. Fruits and vegetables make up about half of your plate, and grains and protein make up the other half. A serving of dairy is included on the side of your plate. The amount of calories and serving sizes you need depends on your age, gender, weight, and height. Examples of healthy foods are listed below:  Eat a variety of vegetables  such as dark green, red, and orange vegetables. You can also include canned vegetables low in sodium (salt) and frozen vegetables without added butter or sauces.    Eat a variety of fresh fruits , canned fruit in 100% juice, frozen fruit, and  dried fruit.    Include whole grains.  At least half of the grains you eat should be whole grains. Examples include whole-wheat bread, wheat pasta, brown rice, and whole-grain cereals such as oatmeal.    Eat a variety of protein foods such as seafood (fish and shellfish), lean meat, and poultry without skin (turkey and chicken). Examples of lean meats include pork leg, shoulder, or tenderloin, and beef round, sirloin, tenderloin, and extra lean ground beef. Other protein foods include eggs and egg substitutes, beans, peas, soy products, nuts, and seeds.    Choose low-fat dairy products such as skim or 1% milk or low-fat yogurt, cheese, and cottage cheese.    Limit unhealthy fats  such as butter, hard margarine, and shortening.       Exercise:  Exercise at least 30 minutes per day on most days of the week. Some examples of exercise include walking, biking, dancing, and swimming. You can also fit in more physical activity by taking the stairs instead of the elevator or parking farther away from stores. Include muscle strengthening activities 2 days each week. Regular exercise provides many health benefits. It helps you manage your weight, and decreases your risk for type 2 diabetes, heart disease, stroke, and high blood pressure. Exercise can also help improve your mood. Ask your healthcare provider about the best exercise plan for you.       General health and safety guidelines:   Do not smoke.  Nicotine and other chemicals in cigarettes and cigars can cause lung damage. Ask your healthcare provider for information if you currently smoke and need help to quit. E-cigarettes or smokeless tobacco still contain nicotine. Talk to your healthcare provider before you use these products.    Limit alcohol.  A drink of alcohol is 12 ounces of beer, 5 ounces of wine, or 1½ ounces of liquor.    Lose weight, if needed.  Being overweight increases your risk of certain health conditions. These include heart disease, high blood  pressure, type 2 diabetes, and certain types of cancer.    Protect your skin.  Do not sunbathe or use tanning beds. Use sunscreen with a SPF 15 or higher. Apply sunscreen at least 15 minutes before you go outside. Reapply sunscreen every 2 hours. Wear protective clothing, hats, and sunglasses when you are outside.    Drive safely.  Always wear your seatbelt. Make sure everyone in your car wears a seatbelt. A seatbelt can save your life if you are in an accident. Do not use your cell phone when you are driving. This could distract you and cause an accident. Pull over if you need to make a call or send a text message.    Practice safe sex.  Use latex condoms if are sexually active and have more than one partner. Your healthcare provider may recommend screening tests for sexually transmitted infections (STIs).    Wear helmets, lifejackets, and protective gear.  Always wear a helmet when you ride a bike or motorcycle, go skiing, or play sports that could cause a head injury. Wear protective equipment when you play sports. Wear a lifejacket when you are on a boat or doing water sports.    © Copyright [employer] 2023 Information is for End User's use only and may not be sold, redistributed or otherwise used for commercial purposes.  The above information is an  only. It is not intended as medical advice for individual conditions or treatments. Talk to your doctor, nurse or pharmacist before following any medical regimen to see if it is safe and effective for you.

## 2024-02-24 ENCOUNTER — APPOINTMENT (OUTPATIENT)
Dept: LAB | Facility: HOSPITAL | Age: 24
End: 2024-02-24
Payer: COMMERCIAL

## 2024-02-24 DIAGNOSIS — Z80.0 FAMILY HISTORY OF PANCREATIC CANCER: ICD-10-CM

## 2024-02-24 DIAGNOSIS — Z13.6 SCREENING FOR CARDIOVASCULAR CONDITION: ICD-10-CM

## 2024-02-24 DIAGNOSIS — E55.9 VITAMIN D DEFICIENCY: ICD-10-CM

## 2024-02-24 LAB
ALBUMIN SERPL BCP-MCNC: 4.6 G/DL (ref 3.5–5)
ALP SERPL-CCNC: 52 U/L (ref 34–104)
ALT SERPL W P-5'-P-CCNC: 28 U/L (ref 7–52)
AMYLASE SERPL-CCNC: 40 IU/L (ref 29–103)
ANION GAP SERPL CALCULATED.3IONS-SCNC: 6 MMOL/L
AST SERPL W P-5'-P-CCNC: 23 U/L (ref 13–39)
BILIRUB SERPL-MCNC: 0.85 MG/DL (ref 0.2–1)
BUN SERPL-MCNC: 11 MG/DL (ref 5–25)
CALCIUM SERPL-MCNC: 9.7 MG/DL (ref 8.4–10.2)
CHLORIDE SERPL-SCNC: 102 MMOL/L (ref 96–108)
CHOLEST SERPL-MCNC: 168 MG/DL
CO2 SERPL-SCNC: 32 MMOL/L (ref 21–32)
CREAT SERPL-MCNC: 0.83 MG/DL (ref 0.6–1.3)
ERYTHROCYTE [DISTWIDTH] IN BLOOD BY AUTOMATED COUNT: 13 % (ref 11.6–15.1)
EST. AVERAGE GLUCOSE BLD GHB EST-MCNC: 103 MG/DL
GFR SERPL CREATININE-BSD FRML MDRD: 125 ML/MIN/1.73SQ M
GLUCOSE P FAST SERPL-MCNC: 91 MG/DL (ref 65–99)
HBA1C MFR BLD: 5.2 %
HCT VFR BLD AUTO: 46.2 % (ref 36.5–49.3)
HDLC SERPL-MCNC: 69 MG/DL
HGB BLD-MCNC: 15.3 G/DL (ref 12–17)
LDLC SERPL CALC-MCNC: 83 MG/DL (ref 0–100)
LIPASE SERPL-CCNC: 17 U/L (ref 11–82)
MCH RBC QN AUTO: 29.7 PG (ref 26.8–34.3)
MCHC RBC AUTO-ENTMCNC: 33.1 G/DL (ref 31.4–37.4)
MCV RBC AUTO: 90 FL (ref 82–98)
NONHDLC SERPL-MCNC: 99 MG/DL
PLATELET # BLD AUTO: 303 THOUSANDS/UL (ref 149–390)
PMV BLD AUTO: 9.2 FL (ref 8.9–12.7)
POTASSIUM SERPL-SCNC: 4.1 MMOL/L (ref 3.5–5.3)
PROT SERPL-MCNC: 7.4 G/DL (ref 6.4–8.4)
RBC # BLD AUTO: 5.15 MILLION/UL (ref 3.88–5.62)
SODIUM SERPL-SCNC: 140 MMOL/L (ref 135–147)
TRIGL SERPL-MCNC: 79 MG/DL
TSH SERPL DL<=0.05 MIU/L-ACNC: 1.29 UIU/ML (ref 0.45–4.5)
WBC # BLD AUTO: 6.74 THOUSAND/UL (ref 4.31–10.16)

## 2024-02-24 PROCEDURE — 84443 ASSAY THYROID STIM HORMONE: CPT

## 2024-02-24 PROCEDURE — 80053 COMPREHEN METABOLIC PANEL: CPT

## 2024-02-24 PROCEDURE — 83690 ASSAY OF LIPASE: CPT

## 2024-02-24 PROCEDURE — 80061 LIPID PANEL: CPT

## 2024-02-24 PROCEDURE — 82306 VITAMIN D 25 HYDROXY: CPT

## 2024-02-24 PROCEDURE — 36415 COLL VENOUS BLD VENIPUNCTURE: CPT

## 2024-02-24 PROCEDURE — 83036 HEMOGLOBIN GLYCOSYLATED A1C: CPT

## 2024-02-24 PROCEDURE — 82150 ASSAY OF AMYLASE: CPT

## 2024-02-24 PROCEDURE — 85027 COMPLETE CBC AUTOMATED: CPT

## 2024-02-25 LAB — 25(OH)D3 SERPL-MCNC: 27.3 NG/ML (ref 30–100)

## 2024-02-26 ENCOUNTER — TELEPHONE (OUTPATIENT)
Dept: FAMILY MEDICINE CLINIC | Facility: CLINIC | Age: 24
End: 2024-02-26

## 2024-02-26 NOTE — TELEPHONE ENCOUNTER
----- Message from TARIK Hood sent at 2/26/2024 10:50 AM EST -----  Vitamin D is low, suggest nightly D3 4000 u or more, everything else looks good    
Patient is aware  
RW/[facility]

## 2024-03-15 ENCOUNTER — HOSPITAL ENCOUNTER (OUTPATIENT)
Dept: ULTRASOUND IMAGING | Facility: HOSPITAL | Age: 24
Discharge: HOME | End: 2024-03-15
Payer: COMMERCIAL

## 2024-03-15 DIAGNOSIS — G56.03 BILATERAL CARPAL TUNNEL SYNDROME: ICD-10-CM

## 2024-03-15 PROCEDURE — 76882 US LMTD JT/FCL EVL NVASC XTR: CPT

## 2024-03-21 ENCOUNTER — TELEPHONE (OUTPATIENT)
Age: 24
End: 2024-03-21

## 2024-03-21 DIAGNOSIS — G56.03 BILATERAL CARPAL TUNNEL SYNDROME: Primary | ICD-10-CM

## 2024-03-21 NOTE — TELEPHONE ENCOUNTER
Patient is being referred to a orthopedics. Please schedule accordingly.    [facility]   [phone]

## 2024-04-05 ENCOUNTER — OFFICE VISIT (OUTPATIENT)
Dept: OBGYN CLINIC | Facility: CLINIC | Age: 24
End: 2024-04-05
Payer: COMMERCIAL

## 2024-04-05 VITALS — WEIGHT: 176 LBS | BODY MASS INDEX: 26.67 KG/M2 | HEIGHT: 68 IN

## 2024-04-05 DIAGNOSIS — G56.01 CARPAL TUNNEL SYNDROME OF RIGHT WRIST: Primary | ICD-10-CM

## 2024-04-05 PROCEDURE — 99214 OFFICE O/P EST MOD 30 MIN: CPT | Performed by: ORTHOPAEDIC SURGERY

## 2024-04-05 NOTE — PROGRESS NOTES
Assessment:  1. Carpal tunnel syndrome of right wrist  Ambulatory Referral to Orthopedic Surgery    EMG 2 Limb Upper Extremity    Ambulatory referral to Physical Therapy        Patient Active Problem List   Diagnosis    Allergic rhinitis    Numbness and tingling in right hand    Sore throat    Family history of pancreatic cancer    Bilateral carpal tunnel syndrome       Plan:    23 y.o. male with right carpal tunnel syndrome    Discussed underlying pathology of diagnosis  Discussed ultrasound findings significant for carpal tunnel syndrome of the right wrist.  Discussed could provide him with a referral to hand surgery who would be able to operate with findings on the ultrasound.  Discussed steroid injection of the carpal tunnel.  Negative patient declined at this time.  Can consider steroid injection at a later date.  Discussed EMG of the upper extremities to define degree of carpal tunnel syndrome and in preparation for possible surgical intervention.  Discussed hand therapy to help take pressure off of the nerve and contribute to strengthening of the wrist.  Patient to follow-up after EMG    The patient was seen and examined by Dr. Owen and myself. The assessment and plan were formulated by Dr. Owen and I assisted in carrying it out.    Subjective:   Patient ID: Mckinley Rojas is a 23 y.o. male .    HPI    Patient comes in today with regards to numbness and tingling.  Patient is referred to us by Waleska Marvin CRNP for further evaluation.  Patient notes numbness and tingling in the thumb, index , and middle finger of the right hand .  Patient notes increase in symptoms at night and while driving after work .patient notes he has tried hand therapy in the past as well as night splinting with some moderate relief of pain.       The following portions of the patient's history were reviewed and updated as appropriate: allergies, current medications, past family history, past social history, past surgical  history and problem list.    Social History     Socioeconomic History    Marital status: Single     Spouse name: Not on file    Number of children: Not on file    Years of education: Not on file    Highest education level: Not on file   Occupational History    Not on file   Tobacco Use    Smoking status: Never    Smokeless tobacco: Never   Vaping Use    Vaping status: Never Used   Substance and Sexual Activity    Alcohol use: No    Drug use: No    Sexual activity: Not Currently     Partners: Female     Birth control/protection: Condom Male   Other Topics Concern    Not on file   Social History Narrative    Lives with parents; siblings 2 sisters live outside home        Pets 3 dogs    Has carbon monoxide and smoke detectors    No guns in the home.    Uses safety belt in the car.    Dad smokes outside.         Social Determinants of Health     Financial Resource Strain: Not on file   Food Insecurity: Not on file   Transportation Needs: Not on file   Physical Activity: Not on file   Stress: Not on file   Social Connections: Not on file   Intimate Partner Violence: Not on file   Housing Stability: Not on file     Past Medical History:   Diagnosis Date    Allergic     Chronic serous otitis media of right ear 1/30/2015    Perforation of tympanic membrane 2/26/2014     Past Surgical History:   Procedure Laterality Date    NO PAST SURGERIES       No Known Allergies  Current Outpatient Medications on File Prior to Visit   Medication Sig Dispense Refill    ibuprofen (MOTRIN) 200 mg tablet Take by mouth every 6 (six) hours as needed for mild pain (Patient not taking: Reported on 2/23/2024)       No current facility-administered medications on file prior to visit.       Review of Systems      Objective:    There were no vitals filed for this visit.    Physical Exam    Right Hand Exam     Tests   Phalen’s sign: positive  Tinel's sign (median nerve): positive    Comments:  Dorkin's positive   Phalen's positive   Tinel's positive  "            I have personally reviewed pertinent films in PACS and my interpretation is ultrasound of the bilateral wrist reveal findings significant for bilateral carpal tunnel syndrome.    Procedures  No Procedures performed today      Scribe Attestation      I,:  Catarina Vazquez PA-C am acting as a scribe while in the presence of the attending physician.:       I,:  Micky Owen,  personally performed the services described in this documentation    as scribed in my presence.:               Portions of the record may have been created with voice recognition software.  Occasional wrong word or \"sound a like\" substitutions may have occurred due to the inherent limitations of voice recognition software.  Read the chart carefully and recognize, using context, where substitutions have occurred.   "

## 2024-04-10 ENCOUNTER — HOSPITAL ENCOUNTER (OUTPATIENT)
Dept: NEUROLOGY | Facility: CLINIC | Age: 24
Discharge: HOME | End: 2024-04-10
Payer: COMMERCIAL

## 2024-04-10 DIAGNOSIS — G56.01 CARPAL TUNNEL SYNDROME OF RIGHT WRIST: ICD-10-CM

## 2024-04-10 PROCEDURE — 95886 MUSC TEST DONE W/N TEST COMP: CPT | Performed by: PSYCHIATRY & NEUROLOGY

## 2024-04-10 PROCEDURE — 95912 NRV CNDJ TEST 11-12 STUDIES: CPT | Performed by: PSYCHIATRY & NEUROLOGY

## 2025-03-28 ENCOUNTER — OFFICE VISIT (OUTPATIENT)
Dept: FAMILY MEDICINE CLINIC | Facility: CLINIC | Age: 25
End: 2025-03-28
Payer: COMMERCIAL

## 2025-03-28 VITALS
HEART RATE: 84 BPM | OXYGEN SATURATION: 97 % | HEIGHT: 68 IN | DIASTOLIC BLOOD PRESSURE: 72 MMHG | SYSTOLIC BLOOD PRESSURE: 116 MMHG | BODY MASS INDEX: 27.13 KG/M2 | WEIGHT: 179 LBS

## 2025-03-28 DIAGNOSIS — M79.10 MUSCULAR PAIN: ICD-10-CM

## 2025-03-28 DIAGNOSIS — M79.605 BILATERAL LEG PAIN: ICD-10-CM

## 2025-03-28 DIAGNOSIS — Z80.0 FAMILY HISTORY OF PANCREATIC CANCER: ICD-10-CM

## 2025-03-28 DIAGNOSIS — E55.9 VITAMIN D DEFICIENCY: ICD-10-CM

## 2025-03-28 DIAGNOSIS — Z13.6 SCREENING FOR CARDIOVASCULAR CONDITION: ICD-10-CM

## 2025-03-28 DIAGNOSIS — Z00.00 ANNUAL PHYSICAL EXAM: Primary | ICD-10-CM

## 2025-03-28 DIAGNOSIS — M79.604 BILATERAL LEG PAIN: ICD-10-CM

## 2025-03-28 PROCEDURE — 99214 OFFICE O/P EST MOD 30 MIN: CPT

## 2025-03-28 PROCEDURE — 99395 PREV VISIT EST AGE 18-39: CPT

## 2025-03-28 NOTE — PATIENT INSTRUCTIONS
"Patient Education     Routine physical for adults   The Basics   Written by the doctors and editors at [facility]   What is a physical? -- A physical is a routine visit, or \"check-up,\" with your doctor. You might also hear it called a \"wellness visit\" or \"preventive visit.\"  During each visit, the doctor will:   Ask about your physical and mental health   Ask about your habits, behaviors, and lifestyle   Do an exam   Give you vaccines if needed   Talk to you about any medicines you take   Give advice about your health   Answer your questions  Getting regular check-ups is an important part of taking care of your health. It can help your doctor find and treat any problems you have. But it's also important for preventing health problems.  A routine physical is different from a \"sick visit.\" A sick visit is when you see a doctor because of a health concern or problem. Since physicals are scheduled ahead of time, you can think about what you want to ask the doctor.  How often should I get a physical? -- It depends on your age and health. In general, for people age 21 years and older:   If you are younger than 50 years, you might be able to get a physical every 3 years.   If you are 50 years or older, your doctor might recommend a physical every year.  If you have an ongoing health condition, like diabetes or high blood pressure, your doctor will probably want to see you more often.  What happens during a physical? -- In general, each visit will include:   Physical exam - The doctor or nurse will check your height, weight, heart rate, and blood pressure. They will also look at your eyes and ears. They will ask about how you are feeling and whether you have any symptoms that bother you.   Medicines - It's a good idea to bring a list of all the medicines you take to each doctor visit. Your doctor will talk to you about your medicines and answer any questions. Tell them if you are having any side effects that bother you. You " "should also tell them if you are having trouble paying for any of your medicines.   Habits and behaviors - This includes:   Your diet   Your exercise habits   Whether you smoke, drink alcohol, or use drugs   Whether you are sexually active   Whether you feel safe at home  Your doctor will talk to you about things you can do to improve your health and lower your risk of health problems. They will also offer help and support. For example, if you want to quit smoking, they can give you advice and might prescribe medicines. If you want to improve your diet or get more physical activity, they can help you with this, too.   Lab tests, if needed - The tests you get will depend on your age and situation. For example, your doctor might want to check your:   Cholesterol   Blood sugar   Iron level   Vaccines - The recommended vaccines will depend on your age, health, and what vaccines you already had. Vaccines are very important because they can prevent certain serious or deadly infections.   Discussion of screening - \"Screening\" means checking for diseases or other health problems before they cause symptoms. Your doctor can recommend screening based on your age, risk, and preferences. This might include tests to check for:   Cancer, such as breast, prostate, cervical, ovarian, colorectal, prostate, lung, or skin cancer   Sexually transmitted infections, such as chlamydia and gonorrhea   Mental health conditions like depression and anxiety  Your doctor will talk to you about the different types of screening tests. They can help you decide which screenings to have. They can also explain what the results might mean.   Answering questions - The physical is a good time to ask the doctor or nurse questions about your health. If needed, they can refer you to other doctors or specialists, too.  Adults older than 65 years often need other care, too. As you get older, your doctor will talk to you about:   How to prevent falling at " home   Hearing or vision tests   Memory testing   How to take your medicines safely   Making sure that you have the help and support you need at home  All topics are updated as new evidence becomes available and our peer review process is complete.  This topic retrieved from [employer] on: May 02, 2024.  Topic 996494 Version 1.0  Release: 32.4.3 - C32.122  © 2024 UpToDate, Inc. and/or its affiliates. All rights reserved.  Consumer Information Use and Disclaimer   Disclaimer: This generalized information is a limited summary of diagnosis, treatment, and/or medication information. It is not meant to be comprehensive and should be used as a tool to help the user understand and/or assess potential diagnostic and treatment options. It does NOT include all information about conditions, treatments, medications, side effects, or risks that may apply to a specific patient. It is not intended to be medical advice or a substitute for the medical advice, diagnosis, or treatment of a health care provider based on the health care provider's examination and assessment of a patient's specific and unique circumstances. Patients must speak with a health care provider for complete information about their health, medical questions, and treatment options, including any risks or benefits regarding use of medications. This information does not endorse any treatments or medications as safe, effective, or approved for treating a specific patient. UpToDate, Inc. and its affiliates disclaim any warranty or liability relating to this information or the use thereof.The use of this information is governed by the Terms of Use, available at https://www.woltersSympozuwer.com/en/know/clinical-effectiveness-terms. 2024© UpToDate, Inc. and its affiliates and/or licensors. All rights reserved.  Copyright   © 2024 Up[employer]Date, Inc. and/or its affiliates. All rights reserved.

## 2025-03-28 NOTE — PROGRESS NOTES
Adult Annual Physical  Name: Mckinley Rojas      : 2000      MRN: [id number]  Encounter Provider: TARIK Hood  Encounter Date: 3/28/2025   Encounter department: [facility] [address]    Assessment & Plan  Family history of pancreatic cancer  Have lab work, will call with results   Orders:  •  Amylase; Future  •  Lipase; Future    Screening for cardiovascular condition  Have lab work, will call with results   Orders:  •  CBC and differential; Future  •  Comprehensive metabolic panel; Future  •  TSH, 3rd generation with Free T4 reflex; Future    Vitamin D deficiency  Continue supplement, will recheck  Orders:  •  Vitamin D 25 hydroxy; Future    Annual physical exam  Recommend myplate and great job with active lifestyle. Have lab work, will call with results        BMI 27.0-27.9,adult  Recommend myplate and great job with active lifestyle. Have lab work, will call with results        Muscular pain  Have labs, given recommendations for plantar fascitis, follow up with sports medicine.   Orders:  •  Magnesium; Standing    Bilateral leg pain  Have labs, given recommendations for plantar fascitis, follow up with sports medicine.   Orders:  •  Ambulatory Referral to Orthopedic Surgery; Future      Preventive Screenings:  - Diabetes Screening: screening up-to-date  - Cholesterol Screening: screening up-to-date   - Hepatitis C screening: patient declines   - HIV screening: patient declines   - Colon cancer screening: screening not indicated   - Lung cancer screening: screening not indicated   - Prostate cancer screening: screening not indicated     Immunizations:  - Immunizations due: Influenza, Tdap and HPV (Gardasil 9)    Counseling/Anticipatory Guidance:  - Alcohol: discussed moderation in alcohol intake and recommendations for healthy alcohol use.   - Drug use: discussed harms of illicit drug use and how it can negatively impact mental/physical health.   - Tobacco  use: discussed harms of tobacco use and management options for quitting.   - Dental health: discussed importance of regular tooth brushing, flossing, and dental visits.   - Sexual health: discussed sexually transmitted diseases, partner selection, use of condoms, avoidance of unintended pregnancy, and contraceptive alternatives.   - Diet: discussed recommendations for a healthy/well-balanced diet.   - Exercise: the importance of regular exercise/physical activity was discussed. Recommend exercise 3-5 times per week for at least 30 minutes.   - Injury prevention: discussed safety/seat belts, safety helmets, smoke detectors, carbon monoxide detectors, and smoking near bedding or upholstery.       Depression Screening and Follow-up Plan: Patient was screened for depression during today's encounter. They screened negative with a PHQ-2 score of 0.          History of Present Illness     Adult Annual Physical:  Patient presents for annual physical.     Diet and Physical Activity:  - Diet/Nutrition: well balanced diet.  - Exercise: 3-4 times a week on average, 5-7 times a week on average, strength training exercises, moderate cardiovascular exercise and 1-2 hours on average.    Depression Screening:  - PHQ-2 Score: 0    General Health:  - Sleep: 7-8 hours of sleep on average.  - Hearing: normal hearing right ear and normal hearing left ear.  - Vision: most recent eye exam > 1 year ago.  - Dental: no dental visits for > 1 year and brushes teeth twice daily.     Health:  - History of STDs: no.   - Urinary symptoms: none.     Advanced Care Planning:  - Has an advanced directive?: no    - Has a durable medical POA?: no    - ACP document given to patient?: no      Review of Systems   Constitutional:  Negative for chills, diaphoresis, fatigue and fever.   HENT:  Negative for congestion, ear pain, rhinorrhea, sinus pressure, sinus pain and sore throat.    Eyes:  Negative for visual disturbance.   Respiratory:  Negative for  cough, chest tightness, shortness of breath and wheezing.    Cardiovascular:  Negative for chest pain and palpitations.   Gastrointestinal:  Negative for abdominal pain, constipation, diarrhea, nausea and vomiting.   Genitourinary:  Negative for dysuria, frequency and hematuria.   Musculoskeletal:  Positive for myalgias. Negative for arthralgias and back pain.   Skin:  Negative for rash.   Neurological:  Negative for dizziness, seizures, syncope, light-headedness and headaches.   Psychiatric/Behavioral:  Negative for dysphoric mood and sleep disturbance. The patient is not nervous/anxious.    All other systems reviewed and are negative.    Pertinent Medical History           Medical History Reviewed by provider this encounter:  Tobacco  Allergies  Meds  Problems  Med Hx  Surg Hx  Fam Hx     .  Past Medical History   Past Medical History:   Diagnosis Date   • Allergic    • Chronic serous otitis media of right ear 1/30/2015   • Perforation of tympanic membrane 2/26/2014     Past Surgical History:   Procedure Laterality Date   • NO PAST SURGERIES       Family History   Problem Relation Age of Onset   • No Known Problems Mother    • No Known Problems Father    • No Known Problems Sister    • No Known Problems Paternal Grandmother    • No Known Problems Paternal Grandfather    • No Known Problems Sister    • Addiction problem Neg Hx    • Mental illness Neg Hx       reports that he has never smoked. He has never used smokeless tobacco. He reports that he does not drink alcohol and does not use drugs.  No current outpatient medicationsNo Known Allergies   Current Outpatient Medications on File Prior to Visit   Medication Sig Dispense Refill   • [DISCONTINUED] ibuprofen (MOTRIN) 200 mg tablet Take by mouth every 6 (six) hours as needed for mild pain (Patient not taking: Reported on 2/23/2024)       No current facility-administered medications on file prior to visit.      Social History     Tobacco Use   • Smoking  "status: Never   • Smokeless tobacco: Never   Vaping Use   • Vaping status: Never Used   Substance and Sexual Activity   • Alcohol use: No   • Drug use: No   • Sexual activity: Not Currently     Partners: Female     Birth control/protection: Condom Male       Objective   /72   Pulse 84   Ht 5' 8\" (1.727 m)   Wt 81.2 kg (179 lb)   SpO2 97%   BMI 27.22 kg/m²     Physical Exam  Vitals and nursing note reviewed.   Constitutional:       General: He is not in acute distress.     Appearance: Normal appearance. He is well-developed. He is not ill-appearing.   HENT:      Head: Normocephalic and atraumatic.      Right Ear: Tympanic membrane, ear canal and external ear normal. There is no impacted cerumen.      Left Ear: Tympanic membrane, ear canal and external ear normal. There is no impacted cerumen.      Nose: Nose normal. No congestion.      Mouth/Throat:      Mouth: Mucous membranes are moist.      Pharynx: No oropharyngeal exudate or posterior oropharyngeal erythema.   Eyes:      Extraocular Movements: Extraocular movements intact.      Conjunctiva/sclera: Conjunctivae normal.      Pupils: Pupils are equal, round, and reactive to light.   Cardiovascular:      Rate and Rhythm: Normal rate and regular rhythm.      Heart sounds: No murmur heard.  Pulmonary:      Effort: Pulmonary effort is normal. No respiratory distress.      Breath sounds: Normal breath sounds.   Abdominal:      Palpations: Abdomen is soft.      Tenderness: There is no abdominal tenderness.   Musculoskeletal:         General: No swelling.      Cervical back: Normal range of motion and neck supple.      Right lower leg: No edema.      Left lower leg: No edema.   Lymphadenopathy:      Cervical: No cervical adenopathy.   Skin:     General: Skin is warm and dry.      Capillary Refill: Capillary refill takes less than 2 seconds.   Neurological:      General: No focal deficit present.      Mental Status: He is alert and oriented to person, place, and " time.   Psychiatric:         Mood and Affect: Mood normal.         Behavior: Behavior normal.